# Patient Record
Sex: MALE | Race: WHITE | Employment: FULL TIME | URBAN - METROPOLITAN AREA
[De-identification: names, ages, dates, MRNs, and addresses within clinical notes are randomized per-mention and may not be internally consistent; named-entity substitution may affect disease eponyms.]

---

## 2017-07-11 ENCOUNTER — OFFICE VISIT (OUTPATIENT)
Dept: ORTHOPEDIC SURGERY | Age: 42
End: 2017-07-11

## 2017-07-11 VITALS
WEIGHT: 315 LBS | SYSTOLIC BLOOD PRESSURE: 132 MMHG | HEIGHT: 71 IN | HEART RATE: 81 BPM | DIASTOLIC BLOOD PRESSURE: 81 MMHG | BODY MASS INDEX: 44.1 KG/M2

## 2017-07-11 DIAGNOSIS — R20.2 NUMBNESS AND TINGLING IN RIGHT HAND: ICD-10-CM

## 2017-07-11 DIAGNOSIS — M25.521 RIGHT ELBOW PAIN: Primary | ICD-10-CM

## 2017-07-11 DIAGNOSIS — R20.0 NUMBNESS AND TINGLING IN RIGHT HAND: ICD-10-CM

## 2017-07-11 PROCEDURE — 73080 X-RAY EXAM OF ELBOW: CPT | Performed by: ORTHOPAEDIC SURGERY

## 2017-07-11 PROCEDURE — L3762 EO RIGID W/O JOINTS PRE OTS: HCPCS | Performed by: ORTHOPAEDIC SURGERY

## 2017-07-11 PROCEDURE — 99203 OFFICE O/P NEW LOW 30 MIN: CPT | Performed by: ORTHOPAEDIC SURGERY

## 2017-07-12 RX ORDER — IBUPROFEN 800 MG/1
800 TABLET ORAL EVERY 8 HOURS PRN
Qty: 60 TABLET | Refills: 0 | Status: SHIPPED | OUTPATIENT
Start: 2017-07-12

## 2017-07-27 ENCOUNTER — OFFICE VISIT (OUTPATIENT)
Dept: ORTHOPEDIC SURGERY | Age: 42
End: 2017-07-27

## 2017-07-27 DIAGNOSIS — M79.601 PAIN OF RIGHT UPPER EXTREMITY: Primary | ICD-10-CM

## 2017-07-27 PROCEDURE — 95886 MUSC TEST DONE W/N TEST COMP: CPT | Performed by: PHYSICAL MEDICINE & REHABILITATION

## 2017-07-27 PROCEDURE — 95909 NRV CNDJ TST 5-6 STUDIES: CPT | Performed by: PHYSICAL MEDICINE & REHABILITATION

## 2017-08-01 ENCOUNTER — OFFICE VISIT (OUTPATIENT)
Dept: ORTHOPEDIC SURGERY | Age: 42
End: 2017-08-01

## 2017-08-01 VITALS — BODY MASS INDEX: 44.1 KG/M2 | WEIGHT: 315 LBS | HEIGHT: 71 IN

## 2017-08-01 DIAGNOSIS — G56.03 BILATERAL CARPAL TUNNEL SYNDROME: Primary | ICD-10-CM

## 2017-08-01 PROCEDURE — 99213 OFFICE O/P EST LOW 20 MIN: CPT | Performed by: ORTHOPAEDIC SURGERY

## 2017-08-01 PROCEDURE — 20526 THER INJECTION CARP TUNNEL: CPT | Performed by: ORTHOPAEDIC SURGERY

## 2017-09-05 ENCOUNTER — OFFICE VISIT (OUTPATIENT)
Dept: ORTHOPEDIC SURGERY | Age: 42
End: 2017-09-05

## 2017-09-05 VITALS — HEIGHT: 71 IN | WEIGHT: 315 LBS | BODY MASS INDEX: 44.1 KG/M2

## 2017-09-05 DIAGNOSIS — G56.03 BILATERAL CARPAL TUNNEL SYNDROME: Primary | ICD-10-CM

## 2017-09-05 PROCEDURE — 99212 OFFICE O/P EST SF 10 MIN: CPT | Performed by: ORTHOPAEDIC SURGERY

## 2017-09-17 PROBLEM — G56.03 BILATERAL CARPAL TUNNEL SYNDROME: Status: ACTIVE | Noted: 2017-09-17

## 2022-10-14 ENCOUNTER — ANESTHESIA EVENT (OUTPATIENT)
Dept: ENDOSCOPY | Age: 47
End: 2022-10-14
Payer: COMMERCIAL

## 2022-10-14 RX ORDER — ACETAMINOPHEN 325 MG/1
650 TABLET ORAL EVERY 6 HOURS PRN
COMMUNITY
Start: 2022-09-30

## 2022-10-14 RX ORDER — LISINOPRIL AND HYDROCHLOROTHIAZIDE 20; 12.5 MG/1; MG/1
1 TABLET ORAL DAILY
COMMUNITY
Start: 2022-06-29

## 2022-10-14 RX ORDER — OMEPRAZOLE 20 MG/1
20 CAPSULE, DELAYED RELEASE ORAL DAILY
COMMUNITY
Start: 2022-10-07

## 2022-10-14 RX ORDER — SIMVASTATIN 20 MG
20 TABLET ORAL DAILY
COMMUNITY
Start: 2022-06-29

## 2022-10-14 NOTE — PROGRESS NOTES
[address] [facility] time____0600________        Surgery time____0730________    Take the following medications with a sip of water: Follow your MD/Surgeons pre-procedure instructions regarding your medications     Do not eat or drink anything after 12:00 midnight prior to your surgery. This includes water chewing gum, mints and ice chips. You may brush your teeth and gargle the morning of your surgery, but do not swallow the water     Please see your family doctor/pediatrician for a history and physical and/or concerning medications. Bring any test results/reports from your physicians office. If you are under the care of a heart doctor or specialist doctor, please be aware that you may be asked to them for clearance    You may be asked to stop blood thinners such as Coumadin, Plavix, Fragmin, Lovenox, etc., or any anti-inflammatories such as:  Aspirin, Ibuprofen, Advil, Naproxen prior to your surgery. We also ask that you stop any OTC medications such as fish oil, vitamin E, glucosamine, garlic, Multivitamins, COQ 10, etc.    We ask that you do not smoke 24 hours prior to surgery  We ask that you do not  drink any alcoholic beverages 24 hours prior to surgery     You must make arrangements for a responsible adult to take you home after your surgery. For your safety you will not be allowed to leave alone or drive yourself home. Your surgery will be cancelled if you do not have a ride home. Also for your safety, it is strongly suggested that someone stay with you the first 24 hours after your surgery. A parent or legal guardian must accompany a child scheduled for surgery and plan to stay at the hospital until the child is discharged. Please do not bring other children with you. For your comfort, please wear simple loose fitting clothing to the hospital.  Please do not bring valuables.     Do not wear any make-up or nail polish on your fingers or toes      For your safety, please do not wear any jewelry or body piercing's on the day of surgery. All jewelry must be removed. If you have dentures, they will be removed before going to operating room. For your convenience, we will provide you with a container. If you wear contact lenses or glasses, they will be removed, please bring a case for them. If you have a living will and a durable power of  for healthcare, please bring in a copy. As part of our patient safety program to minimize surgical site infections, we ask you to do the following:    Please notify your surgeon if you develop any illness between         now and the  day of your surgery. This includes a cough, cold, fever, sore throat, nausea,         or vomiting, and diarrhea, etc.   Please notify your surgeon if you experience dizziness, shortness         of breath or blurred vision between now and the time of your surgery. Do not shave your operative site 96 hours prior to surgery. For face and neck surgery, men may use an electric razor 48 hours   prior to surgery. You may shower the night before surgery or the morning of   your surgery with an antibacterial soap. You will need to bring a photo ID and insurance card    [facility] has an onsite pharmacy, would you like to utilize our pharmacy     If you will be staying overnight and use a C-pap machine, please bring   your C-pap to hospital     Our goal is to provide you with excellent care, therefore, visitors will be limited to two(2) in the room at a time so that we may focus on providing this care for you. Please contact pre-admission testing if you have any further questions. [facility] phone number:  [phone] PAT fax number:  [phone]  Please note these are generalized instructions for all surgical cases, you may be provided with more specific instructions according to your surgery.     C-Difficile admission screening and protocol:       * Admitted with diarrhea? [] YES    [x]  NO     *Prior history of C-Diff. In last 3 months? [x] YES    []  NO     *Antibiotic use in the past 6-8 weeks? [x]  NO    []  YES                 If yes, which ANTIBIOTIC AND REASON______     *Prior hospitalization or nursing home in the last month? []  YES    [x]  NO        SAFETY FIRST. .call before you fall

## 2022-10-17 ENCOUNTER — ANESTHESIA (OUTPATIENT)
Dept: ENDOSCOPY | Age: 47
End: 2022-10-17
Payer: COMMERCIAL

## 2022-10-17 ENCOUNTER — HOSPITAL ENCOUNTER (OUTPATIENT)
Age: 47
Setting detail: OUTPATIENT SURGERY
Discharge: HOME | End: 2022-10-17
Attending: INTERNAL MEDICINE | Admitting: INTERNAL MEDICINE
Payer: COMMERCIAL

## 2022-10-17 VITALS
SYSTOLIC BLOOD PRESSURE: 138 MMHG | TEMPERATURE: 97.3 F | OXYGEN SATURATION: 97 % | DIASTOLIC BLOOD PRESSURE: 84 MMHG | RESPIRATION RATE: 18 BRPM | HEIGHT: 71 IN | BODY MASS INDEX: 44.1 KG/M2 | WEIGHT: 315 LBS | HEART RATE: 76 BPM

## 2022-10-17 DIAGNOSIS — K21.9 GASTROESOPHAGEAL REFLUX DISEASE, UNSPECIFIED WHETHER ESOPHAGITIS PRESENT: ICD-10-CM

## 2022-10-17 PROCEDURE — 7100000001 HC PACU RECOVERY - ADDTL 15 MIN: Performed by: INTERNAL MEDICINE

## 2022-10-17 PROCEDURE — 7100000000 HC PACU RECOVERY - FIRST 15 MIN: Performed by: INTERNAL MEDICINE

## 2022-10-17 PROCEDURE — 88305 TISSUE EXAM BY PATHOLOGIST: CPT

## 2022-10-17 PROCEDURE — 2500000003 HC RX 250 WO HCPCS: Performed by: NURSE ANESTHETIST, CERTIFIED REGISTERED

## 2022-10-17 PROCEDURE — 3700000001 HC ADD 15 MINUTES (ANESTHESIA): Performed by: INTERNAL MEDICINE

## 2022-10-17 PROCEDURE — 3700000000 HC ANESTHESIA ATTENDED CARE: Performed by: INTERNAL MEDICINE

## 2022-10-17 PROCEDURE — 7100000010 HC PHASE II RECOVERY - FIRST 15 MIN: Performed by: INTERNAL MEDICINE

## 2022-10-17 PROCEDURE — 2709999900 HC NON-CHARGEABLE SUPPLY: Performed by: INTERNAL MEDICINE

## 2022-10-17 PROCEDURE — 2580000003 HC RX 258: Performed by: NURSE ANESTHETIST, CERTIFIED REGISTERED

## 2022-10-17 PROCEDURE — 7100000011 HC PHASE II RECOVERY - ADDTL 15 MIN: Performed by: INTERNAL MEDICINE

## 2022-10-17 PROCEDURE — 3609012400 HC EGD TRANSORAL BIOPSY SINGLE/MULTIPLE: Performed by: INTERNAL MEDICINE

## 2022-10-17 PROCEDURE — 6360000002 HC RX W HCPCS: Performed by: NURSE ANESTHETIST, CERTIFIED REGISTERED

## 2022-10-17 RX ORDER — SODIUM CHLORIDE 0.9 % (FLUSH) 0.9 %
5-40 SYRINGE (ML) INJECTION PRN
Status: DISCONTINUED | OUTPATIENT
Start: 2022-10-17 | End: 2022-10-17 | Stop reason: HOSPADM

## 2022-10-17 RX ORDER — LIDOCAINE HYDROCHLORIDE 20 MG/ML
INJECTION, SOLUTION EPIDURAL; INFILTRATION; INTRACAUDAL; PERINEURAL PRN
Status: DISCONTINUED | OUTPATIENT
Start: 2022-10-17 | End: 2022-10-17 | Stop reason: SDUPTHER

## 2022-10-17 RX ORDER — SODIUM CHLORIDE 0.9 % (FLUSH) 0.9 %
5-40 SYRINGE (ML) INJECTION EVERY 12 HOURS SCHEDULED
Status: DISCONTINUED | OUTPATIENT
Start: 2022-10-17 | End: 2022-10-17 | Stop reason: HOSPADM

## 2022-10-17 RX ORDER — GLYCOPYRROLATE 0.2 MG/ML
INJECTION INTRAMUSCULAR; INTRAVENOUS PRN
Status: DISCONTINUED | OUTPATIENT
Start: 2022-10-17 | End: 2022-10-17 | Stop reason: SDUPTHER

## 2022-10-17 RX ORDER — PROPOFOL 10 MG/ML
INJECTION, EMULSION INTRAVENOUS CONTINUOUS PRN
Status: DISCONTINUED | OUTPATIENT
Start: 2022-10-17 | End: 2022-10-17 | Stop reason: SDUPTHER

## 2022-10-17 RX ORDER — SODIUM CHLORIDE 9 MG/ML
INJECTION, SOLUTION INTRAVENOUS CONTINUOUS
Status: DISCONTINUED | OUTPATIENT
Start: 2022-10-17 | End: 2022-10-17 | Stop reason: HOSPADM

## 2022-10-17 RX ORDER — KETAMINE HCL IN NACL, ISO-OSM 100MG/10ML
SYRINGE (ML) INJECTION PRN
Status: DISCONTINUED | OUTPATIENT
Start: 2022-10-17 | End: 2022-10-17 | Stop reason: SDUPTHER

## 2022-10-17 RX ORDER — SODIUM CHLORIDE 9 MG/ML
INJECTION, SOLUTION INTRAVENOUS PRN
Status: DISCONTINUED | OUTPATIENT
Start: 2022-10-17 | End: 2022-10-17 | Stop reason: HOSPADM

## 2022-10-17 RX ORDER — ONDANSETRON 2 MG/ML
4 INJECTION INTRAMUSCULAR; INTRAVENOUS
Status: DISCONTINUED | OUTPATIENT
Start: 2022-10-17 | End: 2022-10-17 | Stop reason: HOSPADM

## 2022-10-17 RX ORDER — SODIUM CHLORIDE 9 MG/ML
INJECTION, SOLUTION INTRAVENOUS CONTINUOUS PRN
Status: DISCONTINUED | OUTPATIENT
Start: 2022-10-17 | End: 2022-10-17 | Stop reason: SDUPTHER

## 2022-10-17 RX ADMIN — Medication 15 MG: at 07:33

## 2022-10-17 RX ADMIN — PROPOFOL 180 MCG/KG/MIN: 10 INJECTION, EMULSION INTRAVENOUS at 07:33

## 2022-10-17 RX ADMIN — SODIUM CHLORIDE: 9 INJECTION, SOLUTION INTRAVENOUS at 07:26

## 2022-10-17 RX ADMIN — LIDOCAINE HYDROCHLORIDE 100 MG: 20 INJECTION, SOLUTION EPIDURAL; INFILTRATION; INTRACAUDAL; PERINEURAL at 07:33

## 2022-10-17 RX ADMIN — GLYCOPYRROLATE 0.2 MG: 0.2 INJECTION, SOLUTION INTRAMUSCULAR; INTRAVENOUS at 07:33

## 2022-10-17 RX ADMIN — Medication 15 MG: at 07:35

## 2022-10-17 ASSESSMENT — PAIN - FUNCTIONAL ASSESSMENT: PAIN_FUNCTIONAL_ASSESSMENT: ACTIVITIES ARE NOT PREVENTED

## 2022-10-17 ASSESSMENT — PAIN DESCRIPTION - ONSET: ONSET: ON-GOING

## 2022-10-17 ASSESSMENT — PAIN SCALES - GENERAL
PAINLEVEL_OUTOF10: 2
PAINLEVEL_OUTOF10: 0

## 2022-10-17 ASSESSMENT — PAIN DESCRIPTION - ORIENTATION: ORIENTATION: MID

## 2022-10-17 ASSESSMENT — PAIN DESCRIPTION - DESCRIPTORS: DESCRIPTORS: ACHING

## 2022-10-17 ASSESSMENT — PAIN DESCRIPTION - PAIN TYPE: TYPE: ACUTE PAIN

## 2022-10-17 ASSESSMENT — PAIN DESCRIPTION - FREQUENCY: FREQUENCY: INTERMITTENT

## 2022-10-17 ASSESSMENT — PAIN DESCRIPTION - LOCATION: LOCATION: ABDOMEN

## 2022-10-17 NOTE — ANESTHESIA POSTPROCEDURE EVALUATION
[facility] Department of Anesthesiology  Post-Anesthesia Note       Name:  René Anthony                                  Age:  50 y.o. MRN:  [id number]     Last Vitals & Oxygen Saturation: /84   Pulse 76   Temp 97.3 °F (36.3 °C) (Temporal)   Resp 18   Ht 5' 10.5\" (1.791 m)   Wt (!) 376 lb (170.6 kg)   SpO2 97%   BMI 53.19 kg/m²   Patient Vitals for the past 4 hrs:   BP Temp Temp src Pulse Resp SpO2   10/17/22 0828 138/84 -- -- 76 18 97 %   10/17/22 0805 129/81 97.3 °F (36.3 °C) Temporal 72 18 98 %   10/17/22 0800 123/79 97.4 °F (36.3 °C) -- 75 16 93 %   10/17/22 0755 124/80 -- -- 74 26 94 %   10/17/22 0750 118/80 -- -- 76 20 95 %   10/17/22 0746 127/79 97 °F (36.1 °C) -- 78 15 97 %   10/17/22 0641 119/70 97.2 °F (36.2 °C) Temporal 77 16 96 %       Level of consciousness:  Awake, alert    Respiratory: Respirations easy, no distress. Stable. Cardiovascular: Hemodynamically stable. Hydration: Adequate. PONV: Adequately managed. Post-op pain: Adequately controlled. Post-op assessment: Tolerated anesthetic well without complication. Complications:  None.     Jagdeep Mohan MD  October 17, 2022   9:17 AM

## 2022-10-17 NOTE — PROGRESS NOTES
Tolerating oral intake and being up in chair. No complaints. Discharge instructions given to patient and father. Verbalize understanding.

## 2022-10-17 NOTE — OP NOTE
Endoscopy Note    Patient: Luis Donnelly  : 1975  Acct#:     Procedure: Esophagogastroduodenoscopy with biopsy                         Date:  10/17/2022     Surgeon:   Brent Martin MD    Referring Physician:  Hardik Welch    Indications: This is a 50y.o. year old male who presents today with GERD/Upper Abdominal Discomfort/Nausea. Postoperative Diagnosis:  1. Gastritis-Biopsied. Anesthesia:  The patient was administered IV propofol per anesthesiology team.  Please see their operative records for full details. Consent:  The patient or their legal guardian has signed an informed consent, and is aware of the potential risks, benefits, alternatives, and potential complications of this procedure. These include, but are not limited to hemorrhage, bleeding, post procedural pain, perforation, phlebitis, aspiration, hypotension, hypoxia, cardiovascular events such as arryhthmia, and possibly death. Description of Procedure: The patient was then taken to the endoscopy suite, placed in the left lateral decubitus position and the above IV sedation was administrered. The Olympus video endoscope was placed through the patient's oropharynx without difficulty to the extent of the 2nd portion of the duodenum. Both forward and retroflexed views of the stomach were obtained. Findings:    Esophagus: The esophagus appeared normal without evidence of [place]'s esophagus or reflux esophagitis. Stomach: The stomach had evidence of gastritis. Biopsied. The stomach otherwise appeared normal on forward and retroflexed views.     Duodenum: The first and 2nd portions of the duodenum appeared normal with normal villous pattern    Estimated Blood Loss (mL): < 5 CC     Complications: None    Specimens:   ID Type Source Tests Collected by Time Destination   A : gastric biopsy evaluate for h.pylori Gastric Gastric SURGICAL PATHOLOGY Brent Martin MD 10/17/2022 9362        The scope was then withdrawn back into the stomach, it was decompressed, and the scope was completely withdrawn. The patient tolerated the procedure well and was taken to the post anesthesia care unit in good condition. Impression:   1) See post procedure diagnoses    Recommendations:   1) Await pathology results  2) Continue Omeprazole. If biopsies are positive for H. Pylori will treat with triple therapy. 3) If biopsies are negative will set up an outpatient appointment to discuss additional work-up. 4) Recommend restarting on Xarelto today. 5) The patient had biopsies taken today. The patient should call for results in 7 days if they have not heard from our office. Our number is [phone].      Noé Claix MD  [address] and [address] [facility]

## 2022-10-17 NOTE — DISCHARGE INSTRUCTIONS
Recommendations:   1) Await pathology results  2) Continue Omeprazole. If biopsies are positive for H. Pylori will treat with triple therapy. 3) If biopsies are negative will set up an outpatient appointment to discuss additional work-up. 4) Recommend restarting on Xarelto today. 5) The patient had biopsies taken today. The patient should call for results in 7 days if they have not heard from our office. Our number is [phone]. Endoscopy Discharge Instructions    Call the physician that did your procedure for any questions or concerns. You may be drowsy or lightheaded after receiving sedation. DO NOT operate  a vehicle (automobile, bicycle, motorcycle, machinery, or power tools), no  alcoholic beverages, and do not make any important decisions today. Plan on bed rest or quiet relaxation today. Resume normal activities in the morning. Resume normal activity tomorrow unless otherwise advised by your physician. Eat a light first meal, avoiding spicy and fatty foods, then resume normal diet unless  you are told otherwise by your physician. If the intravenous medication site is painful, apply warm compresses on the site until the soreness is relieved and elevate the arm above the heart. Call your physician if no improvement  in 2-3 days. POSSIBLE SYMPTOMS TO WATCH:     1. fever (greater than 100) 5. increased abdominal bloating   2. severe pain   6. excessive bleeding   3. nausea and vomiting  7. chest pain   4. chills    8. shortness of breath       Notify your physician if these problems occur     Expected as normal and remedies:  Sore throat: use over the counter throat lozenges or gargle with warm salt water. Redness or soreness at the IV site: apply warm compress  Gaseous discomfort: belching or passing flatus (gas).

## 2022-10-17 NOTE — ANESTHESIA PRE PROCEDURE
[facility] Department of Anesthesiology  Pre-Anesthesia Evaluation/Consultation       Name:  Karene Schlatter  : 1975  Age:  50 y.o. MRN:  [id number]  Date: 10/17/2022           Surgeon: Surgeon(s):  Jacky Hale MD    Procedure: Procedure(s):  ESOPHAGOGASTRODUODENOSCOPY     Allergies   Allergen Reactions    Metformin And Related Nausea Only    Peanut Oil      Patient Active Problem List   Diagnosis    Bilateral carpal tunnel syndrome     Past Medical History:   Diagnosis Date    GERD (gastroesophageal reflux disease)     Hx of blood clots     Hyperlipidemia     Hypertension      History reviewed. No pertinent surgical history. Social History     Tobacco Use    Smoking status: Former     Passive exposure: Never   Vaping Use    Vaping Use: Every day   Substance Use Topics    Alcohol use: Not Currently    Drug use: Not Currently     Medications  No current facility-administered medications on file prior to encounter. Current Outpatient Medications on File Prior to Encounter   Medication Sig Dispense Refill    acetaminophen (TYLENOL) 325 MG tablet Take 650 mg by mouth every 6 hours as needed      lisinopril-hydroCHLOROthiazide (PRINZIDE;ZESTORETIC) 20-12.5 MG per tablet Take 1 tablet by mouth daily      omeprazole (PRILOSEC) 20 MG delayed release capsule Take 20 mg by mouth daily      rivaroxaban (XARELTO) 20 MG TABS tablet Take 20 mg by mouth daily      simvastatin (ZOCOR) 20 MG tablet Take 20 mg by mouth daily      ibuprofen (ADVIL;MOTRIN) 800 MG tablet Take 1 tablet by mouth every 8 hours as needed for Pain 60 tablet 0    Terbinafine HCl (LAMISIL PO) Take by mouth       No current facility-administered medications for this encounter.      Vital Signs (Current)   Vitals:    10/17/22 0641   BP: 119/70   Pulse: 77   Resp: 16   Temp: 97.2 °F (36.2 °C)   SpO2: 96%     Vital Signs Statistics (for past 48 hrs)     Temp  Av.2 °F (36.2 °C)  Min: 97.2 °F (36.2 °C)   Min taken time: 10/17/22 0641  Max: 97.2 °F (36.2 °C)   Max taken time: 10/17/22 0641  Pulse  Av  Min: 68   Min taken time: 10/17/22 0641  Max: 68   Max taken time: 10/17/22 0641  Resp  Av  Min: 12   Min taken time: 10/17/22 0641  Max: 12   Max taken time: 10/17/22 0641  BP  Min: 119/70   Min taken time: 10/17/22 0641  Max: 119/70   Max taken time: 10/17/22 0641  SpO2  Av %  Min: 96 %   Min taken time: 10/17/22 0641  Max: 96 %   Max taken time: 10/17/22 0641    BP Readings from Last 3 Encounters:   10/17/22 119/70   17 132/81     BMI  Body mass index is 53.19 kg/m². Estimated body mass index is 53.19 kg/m² as calculated from the following:    Height as of this encounter: 5' 10.5\" (1.791 m). Weight as of this encounter: 376 lb (170.6 kg). CBC No results found for: WBC, RBC, HGB, HCT, MCV, RDW, PLT  CMP  No results found for: NA, K, CL, CO2, BUN, CREATININE, GFRAA, AGRATIO, LABGLOM, GLUCOSE, GLU, PROT, CALCIUM, BILITOT, ALKPHOS, AST, ALT  BMP  No results found for: NA, K, CL, CO2, BUN, CREATININE, CALCIUM, GFRAA, LABGLOM, GLUCOSE, GLU  POCGlucose  No results for input(s): GLUCOSE in the last 72 hours.    Coags  No results found for: PROTIME, INR, APTT  HCG (If Applicable) No results found for: PREGTESTUR, PREGSERUM, HCG, HCGQUANT   ABGs No results found for: PHART, PO2ART, KXQ9XIZ, JJR5NEH, BEART, V0QAKEQF   Type & Screen (If Applicable)  No results found for: LABABO, LABRH                         BMI: Wt Readings from Last 3 Encounters:       NPO Status:   Date of last liquid consumption: 10/17/22   Time of last liquid consumption: 0300   Date of last solid food consumption: 10/16/22      Time of last solid consumption:        Anesthesia Evaluation  Patient summary reviewed no history of anesthetic complications:   Airway: Mallampati: III  TM distance: >3 FB   Neck ROM: full  Mouth opening: > = 3 FB   Dental: normal exam         Pulmonary:normal exam

## 2022-10-17 NOTE — H&P
Pre-operative History and Physical    Patient: Leatha Mendoza  : 1975  Acct#:     Intended Procedure:  GERD    HISTORY OF PRESENT ILLNESS:  The patient is a 50 y.o. male  who presents for/due to GERD/Upper Abdominal Discomfort/Nausea. Past Medical History:        Diagnosis Date    GERD (gastroesophageal reflux disease)     Hx of blood clots     Hyperlipidemia     Hypertension      Past Surgical History:    History reviewed. No pertinent surgical history. Medications Prior to Admission:   Prior to Admission medications    Medication Sig Start Date End Date Taking? Authorizing Provider   acetaminophen (TYLENOL) 325 MG tablet Take 650 mg by mouth every 6 hours as needed 22  Yes Historical Provider, MD   lisinopril-hydroCHLOROthiazide (PRINZIDE;ZESTORETIC) 20-12.5 MG per tablet Take 1 tablet by mouth daily 22  Yes Historical Provider, MD   omeprazole (PRILOSEC) 20 MG delayed release capsule Take 20 mg by mouth daily 10/7/22  Yes Historical Provider, MD   rivaroxaban (XARELTO) 20 MG TABS tablet Take 20 mg by mouth daily 21  Yes Historical Provider, MD   simvastatin (ZOCOR) 20 MG tablet Take 20 mg by mouth daily 22  Yes Historical Provider, MD   ibuprofen (ADVIL;MOTRIN) 800 MG tablet Take 1 tablet by mouth every 8 hours as needed for Pain 17   Lena Madrigal MD   Terbinafine HCl (LAMISIL PO) Take by mouth    Historical Provider, MD       Allergies:  Metformin and related and Peanut oil    Social History:   TOBACCO:   reports that he has quit smoking. He has never been exposed to tobacco smoke. He does not have any smokeless tobacco history on file. ETOH:   reports that he does not currently use alcohol. DRUGS:   reports that he does not currently use drugs. PHYSICAL EXAM:      Vital Signs: Ht 5' 10.5\" (1.791 m)   Wt (!) 376 lb (170.6 kg)   BMI 53.19 kg/m²    Airway: No stridor or wheezing noted. Good air movement  Pulmonary: without wheezes.   Clear to auscultation  Cardiac:regular rate and rhythm without loud murmurs  Abdomen:soft, nontender,  Bowel sounds present    Pre-Procedure Assessment / Plan:  1) EGD    ASA Grade:  ASA 4 - Patient with severe systemic disease that is a constant threat to life  Mallampati Classification:  Class III    Level of Sedation Plan:Deep sedation    Post Procedure plan: Return to same level of care    I assessed the patient and find that the patient is in satisfactory condition to proceed with the planned procedure and sedation plan. I have explained the risk, benefits, and alternatives to the procedure; the patient understands and agrees to proceed.        Placido Kline MD  10/17/2022

## 2022-12-15 ENCOUNTER — ANESTHESIA EVENT (OUTPATIENT)
Dept: ENDOSCOPY | Age: 47
End: 2022-12-15
Payer: COMMERCIAL

## 2022-12-15 ENCOUNTER — ANESTHESIA (OUTPATIENT)
Dept: ENDOSCOPY | Age: 47
End: 2022-12-15
Payer: COMMERCIAL

## 2022-12-15 ENCOUNTER — HOSPITAL ENCOUNTER (OUTPATIENT)
Age: 47
Setting detail: OUTPATIENT SURGERY
Discharge: HOME | End: 2022-12-15
Attending: INTERNAL MEDICINE | Admitting: INTERNAL MEDICINE
Payer: COMMERCIAL

## 2022-12-15 VITALS
RESPIRATION RATE: 16 BRPM | SYSTOLIC BLOOD PRESSURE: 126 MMHG | OXYGEN SATURATION: 97 % | TEMPERATURE: 97.8 F | BODY MASS INDEX: 45.1 KG/M2 | HEART RATE: 65 BPM | HEIGHT: 70 IN | WEIGHT: 315 LBS | DIASTOLIC BLOOD PRESSURE: 78 MMHG

## 2022-12-15 PROCEDURE — 2709999900 HC NON-CHARGEABLE SUPPLY: Performed by: INTERNAL MEDICINE

## 2022-12-15 PROCEDURE — 6360000002 HC RX W HCPCS: Performed by: NURSE ANESTHETIST, CERTIFIED REGISTERED

## 2022-12-15 PROCEDURE — 3609027000 HC COLONOSCOPY: Performed by: INTERNAL MEDICINE

## 2022-12-15 PROCEDURE — 3700000001 HC ADD 15 MINUTES (ANESTHESIA): Performed by: INTERNAL MEDICINE

## 2022-12-15 PROCEDURE — 2500000003 HC RX 250 WO HCPCS: Performed by: NURSE ANESTHETIST, CERTIFIED REGISTERED

## 2022-12-15 PROCEDURE — 2580000003 HC RX 258: Performed by: NURSE ANESTHETIST, CERTIFIED REGISTERED

## 2022-12-15 PROCEDURE — 7100000010 HC PHASE II RECOVERY - FIRST 15 MIN: Performed by: INTERNAL MEDICINE

## 2022-12-15 PROCEDURE — 3700000000 HC ANESTHESIA ATTENDED CARE: Performed by: INTERNAL MEDICINE

## 2022-12-15 PROCEDURE — 7100000000 HC PACU RECOVERY - FIRST 15 MIN: Performed by: INTERNAL MEDICINE

## 2022-12-15 PROCEDURE — 7100000011 HC PHASE II RECOVERY - ADDTL 15 MIN: Performed by: INTERNAL MEDICINE

## 2022-12-15 PROCEDURE — 7100000001 HC PACU RECOVERY - ADDTL 15 MIN: Performed by: INTERNAL MEDICINE

## 2022-12-15 RX ORDER — SULFAMETHOXAZOLE AND TRIMETHOPRIM 400; 80 MG/1; MG/1
1 TABLET ORAL 2 TIMES DAILY
COMMUNITY

## 2022-12-15 RX ORDER — ONDANSETRON 2 MG/ML
4 INJECTION INTRAMUSCULAR; INTRAVENOUS
Status: DISCONTINUED | OUTPATIENT
Start: 2022-12-15 | End: 2022-12-15 | Stop reason: HOSPADM

## 2022-12-15 RX ORDER — LIDOCAINE HYDROCHLORIDE 20 MG/ML
INJECTION, SOLUTION EPIDURAL; INFILTRATION; INTRACAUDAL; PERINEURAL PRN
Status: DISCONTINUED | OUTPATIENT
Start: 2022-12-15 | End: 2022-12-15 | Stop reason: SDUPTHER

## 2022-12-15 RX ORDER — PROPOFOL 10 MG/ML
INJECTION, EMULSION INTRAVENOUS PRN
Status: DISCONTINUED | OUTPATIENT
Start: 2022-12-15 | End: 2022-12-15 | Stop reason: SDUPTHER

## 2022-12-15 RX ORDER — SODIUM CHLORIDE 0.9 % (FLUSH) 0.9 %
5-40 SYRINGE (ML) INJECTION EVERY 12 HOURS SCHEDULED
Status: DISCONTINUED | OUTPATIENT
Start: 2022-12-15 | End: 2022-12-15 | Stop reason: HOSPADM

## 2022-12-15 RX ORDER — SODIUM CHLORIDE 0.9 % (FLUSH) 0.9 %
5-40 SYRINGE (ML) INJECTION PRN
Status: DISCONTINUED | OUTPATIENT
Start: 2022-12-15 | End: 2022-12-15 | Stop reason: HOSPADM

## 2022-12-15 RX ORDER — SODIUM CHLORIDE 9 MG/ML
INJECTION, SOLUTION INTRAVENOUS CONTINUOUS PRN
Status: DISCONTINUED | OUTPATIENT
Start: 2022-12-15 | End: 2022-12-15 | Stop reason: SDUPTHER

## 2022-12-15 RX ORDER — GLYCOPYRROLATE 0.2 MG/ML
INJECTION INTRAMUSCULAR; INTRAVENOUS PRN
Status: DISCONTINUED | OUTPATIENT
Start: 2022-12-15 | End: 2022-12-15 | Stop reason: SDUPTHER

## 2022-12-15 RX ORDER — SODIUM CHLORIDE 9 MG/ML
INJECTION, SOLUTION INTRAVENOUS PRN
Status: DISCONTINUED | OUTPATIENT
Start: 2022-12-15 | End: 2022-12-15 | Stop reason: HOSPADM

## 2022-12-15 RX ADMIN — PROPOFOL 50 MG: 10 INJECTION, EMULSION INTRAVENOUS at 11:02

## 2022-12-15 RX ADMIN — SODIUM CHLORIDE: 9 INJECTION, SOLUTION INTRAVENOUS at 10:55

## 2022-12-15 RX ADMIN — LIDOCAINE HYDROCHLORIDE 60 MG: 20 INJECTION, SOLUTION EPIDURAL; INFILTRATION; INTRACAUDAL; PERINEURAL at 10:59

## 2022-12-15 RX ADMIN — PROPOFOL 100 MG: 10 INJECTION, EMULSION INTRAVENOUS at 10:59

## 2022-12-15 RX ADMIN — PROPOFOL 50 MG: 10 INJECTION, EMULSION INTRAVENOUS at 11:06

## 2022-12-15 RX ADMIN — PROPOFOL 50 MG: 10 INJECTION, EMULSION INTRAVENOUS at 11:08

## 2022-12-15 RX ADMIN — PROPOFOL 50 MG: 10 INJECTION, EMULSION INTRAVENOUS at 11:04

## 2022-12-15 RX ADMIN — GLYCOPYRROLATE 0.1 MG: 0.2 INJECTION, SOLUTION INTRAMUSCULAR; INTRAVENOUS at 10:59

## 2022-12-15 ASSESSMENT — PAIN - FUNCTIONAL ASSESSMENT: PAIN_FUNCTIONAL_ASSESSMENT: 0-10

## 2022-12-15 ASSESSMENT — ENCOUNTER SYMPTOMS: SHORTNESS OF BREATH: 0

## 2022-12-15 NOTE — H&P
Pre-operative History and Physical    Patient: Lala Griffin  : 1975  Acct#:     Intended Procedure:  Colonoscopy    HISTORY OF PRESENT ILLNESS:  The patient is a 50 y.o. male  who presents for/due to Screening      Past Medical History:        Diagnosis Date    GERD (gastroesophageal reflux disease)     Hx of blood clots     Hyperlipidemia     Hypertension      Past Surgical History:        Procedure Laterality Date    UPPER GASTROINTESTINAL ENDOSCOPY N/A 10/17/2022    EGD BIOPSY performed by Carroll Lovelace MD at [facility]     Medications Prior to Admission:   Prior to Admission medications    Medication Sig Start Date End Date Taking? Authorizing Provider   sulfamethoxazole-trimethoprim (BACTRIM) 400-80 MG per tablet Take 1 tablet by mouth 2 times daily   Yes Historical Provider, MD   acetaminophen (TYLENOL) 325 MG tablet Take 650 mg by mouth every 6 hours as needed 22   Historical Provider, MD   lisinopril-hydroCHLOROthiazide (PRINZIDE;ZESTORETIC) 20-12.5 MG per tablet Take 1 tablet by mouth daily 22   Historical Provider, MD   omeprazole (PRILOSEC) 20 MG delayed release capsule Take 20 mg by mouth daily  Patient not taking: Reported on 12/15/2022 10/7/22   Historical Provider, MD   rivaroxaban (XARELTO) 20 MG TABS tablet Take 20 mg by mouth daily 21   Historical Provider, MD   simvastatin (ZOCOR) 20 MG tablet Take 20 mg by mouth daily 22   Historical Provider, MD   ibuprofen (ADVIL;MOTRIN) 800 MG tablet Take 1 tablet by mouth every 8 hours as needed for Pain 17   Earl Friend, MD   Terbinafine HCl (LAMISIL PO) Take by mouth  Patient not taking: Reported on 12/15/2022    Historical Provider, MD       Allergies:  Metformin and related and Peanut oil    Social History:   TOBACCO:   reports that he has quit smoking. He has never been exposed to tobacco smoke. He does not have any smokeless tobacco history on file.   ETOH:   reports that he does not currently use alcohol. DRUGS:   reports that he does not currently use drugs. PHYSICAL EXAM:      Vital Signs: /63   Pulse 77   Temp 98.1 °F (36.7 °C) (Oral)   Resp 16   Ht 5' 10\" (1.778 m)   Wt (!) 372 lb (168.7 kg)   SpO2 97%   BMI 53.38 kg/m²    Airway: No stridor or wheezing noted. Good air movement  Pulmonary: without wheezes. Clear to auscultation  Cardiac:regular rate and rhythm without loud murmurs  Abdomen:soft, nontender,  Bowel sounds present    Pre-Procedure Assessment / Plan:  1) Colonoscopy    ASA Grade:  ASA 3 - Patient with moderate systemic disease with functional limitations  Mallampati Classification:  Class III    Level of Sedation Plan:Deep sedation    Post Procedure plan: Return to same level of care    I assessed the patient and find that the patient is in satisfactory condition to proceed with the planned procedure and sedation plan. I have explained the risk, benefits, and alternatives to the procedure; the patient understands and agrees to proceed.        Anthony Clements MD  12/15/2022

## 2022-12-15 NOTE — DISCHARGE INSTRUCTIONS
Recommendations:    Normal Colonoscopy with no causes of pain identified. Patient reports some improvement with starting on Metamucil. Recommend resuming Xarelto today. Recommend repeat colonoscopy in 10 years. Discharge Instructions for Colonoscopy     Colonoscopy is a visual exam of the lining of the large intestine, also called the bowel or colon, with a colonoscope. A colonoscope is a flexible tube with a light and a viewing device. It allows the doctor to view the inside of the colon through a tiny video camera. Colonoscopy is performed for many reasons: unexplained anemia , pain, diarrhea , bloody stools, cancer screening, among many other reasons. Complications from a colonoscopy are rare. Some possible serious complications include perforated bowel (which might require surgery) and bleeding (which could require blood transfusion ). Minor complications include bloating, gas, and cramping that can last for 1-2 days after the procedure. Because air is put into your colon during the procedure, it is normal to pass large amounts of air from your rectum. You may not have a bowel movement for 1-3 days after the procedure. What You Will Need:  Someone to drive you home after the procedure     Steps to Take:  [address] when you get home. Because the sedative will make you drowsy, don't drive, operate machinery, or make important decisions the day of the procedure. Feelings of bloating, gas, or cramping may persist for 24 hours. Diet -  Try sips of water first. If tolerated, resume bland food (scrambled eggs, toast, soup) first.  If tolerated, resume regular diet or the diet recommended by your physician. Do not drink alcohol for 24 hours. Physical Activity -  Ask your doctor when you will be able to return to work. Do not drive, operate heavy machinery, or do activities that require coordination or balance for 24 hours.    Otherwise, return to your normal routine as soon as you are comfortable to do so, which is usually the next day after the procedure. Medications - When taking medications, it's important to: Take your medication as directed, not more, not less, not at a different time. Do not stop taking them without consulting your healthcare provider. Don't share them with anyone else. Know what effects and side effects to expect, and report them to your healthcare provider. If you are taking more than one drug, even if it is an over-the-counter medication, herb, or dietary supplement, be sure to check with a physician or pharmacist about drug interactions. Plan ahead for refills so you don't run out. Lifestyle Changes - The results of your colonoscopy will determine if any lifestyle changes are necessary. Follow-up:  The doctor will usually give you a preliminary report after the medication wears off and you are more alert. The results from a biopsy can take as long as 1-2 weeks to be completed. Schedule a follow-up appointment as directed by your doctor. You should schedule a follow-up colonoscopy as recommended by your doctor. Call Your Doctor If Any of the Following Occurs:  Bleeding from your rectum; notify your doctor if you pass a teaspoonful or more of blood   Black, tarry stools   Severe abdominal pain   Hard, swollen abdomen   Signs of infection, including fever or chills   Inability to pass gas or stool   Coughing, shortness of breath, chest pain, severe nausea or vomiting     In case of an emergency, call 911 immediately.

## 2022-12-15 NOTE — ANESTHESIA POSTPROCEDURE EVALUATION
Department of Anesthesiology  Postprocedure Note    Patient: Kiel Desir  MRN: [id number]  YOB: 1975  Date of evaluation: 12/15/2022      Procedure Summary     Date: 12/15/22 Room / Location: [address] [place] [address]    Anesthesia Start: 1055 Anesthesia Stop: 1115    Procedure: COLONOSCOPY DIAGNOSTIC Diagnosis:       Screen for colon cancer      (Colon Cancer Screening)    Surgeons: Erika Jones MD Responsible Provider: Miranda Hung MD    Anesthesia Type: MAC ASA Status: 3          Anesthesia Type: MAC    Anna Marie Phase I: Anna Marie Score: 10    Anna Marie Phase II: Anna Marie Score: 10      Anesthesia Post Evaluation    Patient location during evaluation: PACU  Patient participation: complete - patient participated  Level of consciousness: awake and alert  Airway patency: patent  Nausea & Vomiting: no nausea and no vomiting  Complications: no  Cardiovascular status: hemodynamically stable  Respiratory status: acceptable  Hydration status: stable

## 2022-12-15 NOTE — PROGRESS NOTES
Pt tolerating po. Discharge instructions given to patient and his father. Verbalized understanding. IV d/c'd.

## 2022-12-15 NOTE — ANESTHESIA PRE PROCEDURE
Department of Anesthesiology  Preprocedure Note       Name:  Sheril Harada   Age:  50 y.o.  :  1975                                          MRN:  [id number]         Date:  12/15/2022      Surgeon: Josef Castillo):  Anthony Clements MD    Procedure: Procedure(s):  COLONOSCOPY DIAGNOSTIC    Medications prior to admission:   Prior to Admission medications    Medication Sig Start Date End Date Taking? Authorizing Provider   sulfamethoxazole-trimethoprim (BACTRIM) 400-80 MG per tablet Take 1 tablet by mouth 2 times daily   Yes Historical Provider, MD   acetaminophen (TYLENOL) 325 MG tablet Take 650 mg by mouth every 6 hours as needed 22   Historical Provider, MD   lisinopril-hydroCHLOROthiazide (PRINZIDE;ZESTORETIC) 20-12.5 MG per tablet Take 1 tablet by mouth daily 22   Historical Provider, MD   omeprazole (PRILOSEC) 20 MG delayed release capsule Take 20 mg by mouth daily  Patient not taking: Reported on 12/15/2022 10/7/22   Historical Provider, MD   rivaroxaban (XARELTO) 20 MG TABS tablet Take 20 mg by mouth daily 21   Historical Provider, MD   simvastatin (ZOCOR) 20 MG tablet Take 20 mg by mouth daily 22   Historical Provider, MD   ibuprofen (ADVIL;MOTRIN) 800 MG tablet Take 1 tablet by mouth every 8 hours as needed for Pain 17   Michaela Schwarz MD   Terbinafine HCl (LAMISIL PO) Take by mouth  Patient not taking: Reported on 12/15/2022    Historical Provider, MD       Current medications:    No current facility-administered medications for this encounter. Allergies:     Allergies   Allergen Reactions    Metformin And Related Nausea Only    Peanut Oil        Problem List:    Patient Active Problem List   Diagnosis Code    Bilateral carpal tunnel syndrome G56.03       Past Medical History:        Diagnosis Date    GERD (gastroesophageal reflux disease)     Hx of blood clots     Hyperlipidemia     Hypertension        Past Surgical History:        Procedure Laterality Date  UPPER GASTROINTESTINAL ENDOSCOPY N/A 10/17/2022    EGD BIOPSY performed by Sherl Dance, MD at [address] [facility] History:    Social History     Tobacco Use    Smoking status: Former     Passive exposure: Never    Smokeless tobacco: Not on file   Substance Use Topics    Alcohol use: Not Currently                                Counseling given: Not Answered      Vital Signs (Current):   Vitals:    12/15/22 0954   BP: 109/63   Pulse: 77   Resp: 16   Temp: 98.1 °F (36.7 °C)   TempSrc: Oral   SpO2: 97%   Weight: (!) 372 lb (168.7 kg)   Height: 5' 10\" (1.778 m)                                              BP Readings from Last 3 Encounters:   12/15/22 109/63   10/17/22 138/84   07/11/17 132/81       NPO Status: Time of last liquid consumption: 0500                        Time of last solid consumption: 0800                        Date of last liquid consumption: 12/15/22                        Date of last solid food consumption: 12/14/22    BMI:   Wt Readings from Last 3 Encounters:   12/15/22 (!) 372 lb (168.7 kg)   10/14/22 (!) 376 lb (170.6 kg)   09/05/17 (!) 330 lb 0.5 oz (149.7 kg)     Body mass index is 53.38 kg/m². CBC: No results found for: WBC, RBC, HGB, HCT, MCV, RDW, PLT    CMP: No results found for: NA, K, CL, CO2, BUN, CREATININE, GFRAA, AGRATIO, LABGLOM, GLUCOSE, GLU, PROT, CALCIUM, BILITOT, ALKPHOS, AST, ALT    POC Tests: No results for input(s): POCGLU, POCNA, POCK, POCCL, POCBUN, POCHEMO, POCHCT in the last 72 hours.     Coags: No results found for: PROTIME, INR, APTT    HCG (If Applicable): No results found for: PREGTESTUR, PREGSERUM, HCG, HCGQUANT     ABGs: No results found for: PHART, PO2ART, BMT9OSO, WEJ1KKJ, BEART, F5OPIIRS     Type & Screen (If Applicable):  No results found for: LABABO, LABRH    Drug/Infectious Status (If Applicable):  No results found for: HIV, HEPCAB    COVID-19 Screening (If Applicable): No results found for: COVID19        Anesthesia Evaluation  Patient summary reviewed no history of anesthetic complications:   Airway: Mallampati: III  TM distance: >3 FB   Neck ROM: full  Mouth opening: > = 3 FB   Dental:      Comment: No loose teeth    Pulmonary: breath sounds clear to auscultation  (+) sleep apnea: on CPAP,      (-) COPD, asthma and shortness of breath                           Cardiovascular:    (+) hypertension:, hyperlipidemia    (-) valvular problems/murmurs, past MI, CAD, CABG/stent, dysrhythmias,  angina,  CHF, orthopnea and no pulmonary hypertension      Rhythm: regular  Rate: normal                    Neuro/Psych:   (+) neuromuscular disease:,    (-) seizures, TIA, CVA, headaches and psychiatric history           GI/Hepatic/Renal:   (+) GERD:, bowel prep, morbid obesity     (-) PUD, hepatitis and liver disease       Endo/Other:    (+) blood dyscrasia::., .    (-) diabetes mellitus, hypothyroidism               Abdominal:   (+) obese,           Vascular: Other Findings:           Anesthesia Plan      MAC     ASA 3       Induction: intravenous. Anesthetic plan and risks discussed with patient. Plan discussed with CRNA. This pre-anesthesia assessment may be used as a history and physical.    DOS STAFF ADDENDUM:    Pt seen and examined, chart reviewed (including anesthesia, drug and allergy history). No interval changes to history and physical examination. Anesthetic plan, risks, benefits, alternatives, and personnel involved discussed with patient. Patient verbalized an understanding and agrees to proceed.       Petey Ortega MD  December 15, 2022  10:15 AM      Petey Ortega MD   12/15/2022

## 2022-12-15 NOTE — OP NOTE
Colonoscopy Procedure Note      Patient: Emigdio Collazo  : 1975  Acct#:     Procedure: Colonoscopy    Date:  12/15/2022    Surgeon:  Sherl Dance, MD    Referring Physician:  Kianna Holliday    Previous Colonoscopy: NO  Date: N/A  Greater than 3 years: N/A    Preoperative Diagnosis:  1. Screening    Postoperative Diagnosis:  1. Normal Colonoscopy     Consent:  The patient or their legal guardian has signed a consent, and is aware of the potential risks, benefits, alternatives, and potential complications of this procedure. These include, but are not limited to hemorrhage, bleeding, post procedural pain, perforation, phlebitis, aspiration, hypotension, hypoxia, cardiovascular events such as arryhthmia, and possibly death. Additionally, the possibility of missed colonic polyps and interval colon cancer was discussed in the consent. Anesthesia:  The patient was administered IV propofol per anesthesiology team.  Please see their operative records for full details. Procedure: An informed consent was obtained from the patient after explanation of indications, benefits, possible risks and complications of the procedure. The patient was then taken to the endoscopy suite, placed in the left lateral decubitus position, and the above IV anesthesia was administered. A digital rectal examination was performed and revealed negative without mass, lesions or tenderness. The Olympus video colonoscope was placed in the patient's rectum under digital direction and advanced to the cecum. The cecum was identified by characteristic anatomy and ballottment. The preparation was excellent. The ileocecal valve was identified. The scope was then withdrawn back through the cecum, ascending, transverse, descending, sigmoid colon, and rectum. Careful circumferential examination of the mucosa in these areas demonstrated:     The entire colon was normal with no polyps, masses, or other significant abnormalities. The scope was then withdrawn into the rectum and retroflexed. The retroflexed view of the anal verge and rectum demonstrates normal rectum. The scope was straightened, the colon was decompressed and the scope was withdrawn from the patient. The patient tolerated the procedure well and was taken to the PACU in good condition. Estimated Blood Loss (mL): * None    Complications: None    Specimens: * No specimens in log *    Impression:  See post-procedure diagnoses. Recommendations:    Normal Colonoscopy with no causes of pain identified. Patient reports some improvement with starting on Metamucil. Recommend resuming Xarelto today. Recommend repeat colonoscopy in 10 years.      Jadyn Decree, [address] [facility]  12/15/2022  [phone]

## (undated) DEVICE — BITE BLOCK ENDOSCP AD 60 FR W/ ADJ STRP PLAS GRN BLOX

## (undated) DEVICE — ENDOSCOPY KIT: Brand: MEDLINE INDUSTRIES, INC.

## (undated) DEVICE — FORCEPS BX 240CM 2.4MM L NDL RAD JAW 4 M00513334

## (undated) DEVICE — CONTAINER SPEC 480ML CLR POLYSTYR 10% NEUT BUFF FRMLN ZN